# Patient Record
Sex: FEMALE | Race: WHITE | NOT HISPANIC OR LATINO | URBAN - METROPOLITAN AREA
[De-identification: names, ages, dates, MRNs, and addresses within clinical notes are randomized per-mention and may not be internally consistent; named-entity substitution may affect disease eponyms.]

---

## 2020-07-02 ENCOUNTER — OFFICE VISIT (OUTPATIENT)
Dept: URBAN - METROPOLITAN AREA CLINIC 54 | Facility: CLINIC | Age: 65
End: 2020-07-02

## 2020-07-13 ENCOUNTER — OFFICE VISIT (OUTPATIENT)
Dept: URBAN - METROPOLITAN AREA TELEHEALTH 2 | Facility: TELEHEALTH | Age: 65
End: 2020-07-13
Payer: MEDICARE

## 2020-07-13 DIAGNOSIS — R74.8 LIVER ENZYME ELEVATION: ICD-10-CM

## 2020-07-13 DIAGNOSIS — R10.84 ABDOMINAL CRAMPING, GENERALIZED: ICD-10-CM

## 2020-07-13 DIAGNOSIS — K58.9 IBS (IRRITABLE BOWEL SYNDROME)-C: ICD-10-CM

## 2020-07-13 DIAGNOSIS — E11.9 DIABETES: ICD-10-CM

## 2020-07-13 DIAGNOSIS — R10.10 UPPER ABDOMINAL PAIN: ICD-10-CM

## 2020-07-13 PROCEDURE — G9903 PT SCRN TBCO ID AS NON USER: HCPCS | Performed by: INTERNAL MEDICINE

## 2020-07-13 PROCEDURE — 99214 OFFICE O/P EST MOD 30 MIN: CPT | Performed by: INTERNAL MEDICINE

## 2020-07-13 PROCEDURE — 99244 OFF/OP CNSLTJ NEW/EST MOD 40: CPT | Performed by: INTERNAL MEDICINE

## 2020-07-13 PROCEDURE — 1036F TOBACCO NON-USER: CPT | Performed by: INTERNAL MEDICINE

## 2020-07-13 RX ORDER — OMEPRAZOLE 40 MG/1
1 CAPSULE 30 MINUTES BEFORE MORNING MEAL CAPSULE, DELAYED RELEASE ORAL ONCE A DAY
Qty: 30 | OUTPATIENT
Start: 2020-07-13

## 2020-07-13 RX ORDER — HYDROCHLOROTHIAZIDE 12.5 MG/1
1 TABLET IN THE MORNING TABLET ORAL ONCE A DAY
Status: ACTIVE | COMMUNITY

## 2020-07-13 RX ORDER — BACILLUS COAGULANS/LACTASE 500MM-3000
AS DIRECTED CAPSULE ORAL
Status: ACTIVE | COMMUNITY

## 2020-07-13 RX ORDER — TRAZODONE HYDROCHLORIDE 150 MG/1
1-2 TABLET AT BEDTIME TABLET ORAL
Status: ACTIVE | COMMUNITY

## 2020-07-13 RX ORDER — BUSPIRONE HYDROCHLORIDE 30 MG/1
1 TABLET TABLET ORAL TWICE A DAY
Status: ACTIVE | COMMUNITY

## 2020-07-13 RX ORDER — PAROXETINE HYDROCHLORIDE HEMIHYDRATE 20 MG/1
4 TABLETS IN THE MORNING TABLET, FILM COATED ORAL ONCE A DAY
Status: ACTIVE | COMMUNITY

## 2020-07-13 RX ORDER — DICYCLOMINE HYDROCHLORIDE 20 MG/1
TAKE 1 TABLET BY MOUTH THREE TIMES DAILY AS NEEDED FOR 30 DAYS TABLET ORAL THREE TIMES A DAY
Qty: 90 | Refills: 2 | OUTPATIENT
Start: 2020-07-13 | End: 2021-04-09

## 2020-07-13 RX ORDER — ZOLPIDEM TARTRATE 10 MG/1
1 TABLET AT BEDTIME AS NEEDED TABLET, FILM COATED ORAL ONCE A DAY
Status: ACTIVE | COMMUNITY

## 2020-07-13 RX ORDER — ATORVASTATIN CALCIUM 10 MG/1
1 TABLET TABLET, FILM COATED ORAL ONCE A DAY
Status: ACTIVE | COMMUNITY

## 2020-07-13 RX ORDER — OXYCODONE HYDROCHLORIDE AND ACETAMINOPHEN 7.5; 325 MG/1; MG/1
1 TABLET AS NEEDED TABLET ORAL
Status: ACTIVE | COMMUNITY

## 2020-07-13 RX ORDER — DICYCLOMINE HYDROCHLORIDE 20 MG/1
1 TABLET TABLET ORAL
Status: ACTIVE | COMMUNITY

## 2020-07-13 RX ORDER — LUBIPROSTONE 24 UG/1
1 CAPSULE WITH FOOD AND WATER CAPSULE, GELATIN COATED ORAL TWICE A DAY
Qty: 60 | OUTPATIENT
Start: 2020-07-13 | End: 2020-08-12

## 2020-07-13 RX ORDER — ALPRAZOLAM 1 MG/1
0.5-1 TABLET TABLET ORAL
Status: ACTIVE | COMMUNITY

## 2020-07-13 RX ORDER — ATENOLOL 25 MG/1
1 TABLET TABLET ORAL ONCE A DAY
Status: ACTIVE | COMMUNITY

## 2020-07-13 RX ORDER — MONTELUKAST SODIUM 10 MG/1
1 TABLET TABLET ORAL ONCE A DAY
Status: ACTIVE | COMMUNITY

## 2020-07-13 NOTE — HPI-TODAY'S VISIT:
Pt reports that she underwent Nissen fundoplication in 2017 along with hernia repair. Pt reports that since then she has had a sensation of a band around rib cage up front. Pt reports that she would have constipation. Pt reports that when she would eats she has abdominal bloating. pt reports that she looks pregnant.  Pt reports that she can feel acid in her stomach as well.  Pt reports that the stomach is tight when this occurs.  Pt reports that she will skip breakfast . Pt report that she has an excess amount of gas as well

## 2020-07-21 ENCOUNTER — LAB OUTSIDE AN ENCOUNTER (OUTPATIENT)
Dept: URBAN - METROPOLITAN AREA CLINIC 54 | Facility: CLINIC | Age: 65
End: 2020-07-21

## 2020-07-27 LAB — PANCREATIC ELASTASE, FECAL: >500

## 2020-08-06 ENCOUNTER — ERX REFILL RESPONSE (OUTPATIENT)
Dept: URBAN - METROPOLITAN AREA TELEHEALTH 2 | Facility: TELEHEALTH | Age: 65
End: 2020-08-06

## 2020-08-06 RX ORDER — OMEPRAZOLE 40 MG/1
1 CAPSULE 30 MINUTES BEFORE MORNING MEAL ONCE A DAY ORALLY 30 DAY(S) CAPSULE, DELAYED RELEASE ORAL
Qty: 90 CAPSULE | Refills: 0 | OUTPATIENT

## 2020-08-06 RX ORDER — OMEPRAZOLE 40 MG/1
1 CAPSULE 30 MINUTES BEFORE MORNING MEAL CAPSULE, DELAYED RELEASE ORAL ONCE A DAY
Qty: 30 | Refills: 0 | OUTPATIENT

## 2020-08-06 RX ORDER — LUBIPROSTONE 24 UG/1
1 CAPSULE WITH FOOD AND WATER TWICE A DAY ORALLY 30 DAY(S) CAPSULE, GELATIN COATED ORAL
Qty: 180 CAPSULE | Refills: 0 | OUTPATIENT

## 2020-08-06 RX ORDER — LUBIPROSTONE 24 UG/1
1 CAPSULE WITH FOOD AND WATER CAPSULE, GELATIN COATED ORAL TWICE A DAY
Qty: 60 | Refills: 0 | OUTPATIENT

## 2020-08-12 ENCOUNTER — OUT OF OFFICE VISIT (OUTPATIENT)
Dept: URBAN - METROPOLITAN AREA MEDICAL CENTER 23 | Facility: MEDICAL CENTER | Age: 65
End: 2020-08-12
Payer: MEDICARE

## 2020-08-12 DIAGNOSIS — R74.0 ABNORMAL AST AND ALT: ICD-10-CM

## 2020-08-12 DIAGNOSIS — K52.89 (LYMPHOCYTIC) MICROSCOPIC COLITIS: ICD-10-CM

## 2020-08-12 DIAGNOSIS — R74.8 ABNORMAL ALKALINE PHOSPHATASE TEST: ICD-10-CM

## 2020-08-12 PROCEDURE — 99222 1ST HOSP IP/OBS MODERATE 55: CPT | Performed by: REGISTERED NURSE

## 2020-08-12 PROCEDURE — G8427 DOCREV CUR MEDS BY ELIG CLIN: HCPCS | Performed by: REGISTERED NURSE

## 2020-08-31 ENCOUNTER — OFFICE VISIT (OUTPATIENT)
Dept: URBAN - METROPOLITAN AREA CLINIC 54 | Facility: CLINIC | Age: 65
End: 2020-08-31
Payer: MEDICARE

## 2020-08-31 DIAGNOSIS — R74.8 LIVER ENZYME ELEVATION: ICD-10-CM

## 2020-08-31 DIAGNOSIS — K52.9 COLITIS: ICD-10-CM

## 2020-08-31 DIAGNOSIS — K76.0 FATTY LIVER: ICD-10-CM

## 2020-08-31 DIAGNOSIS — K58.9 IBS (IRRITABLE BOWEL SYNDROME)-C: ICD-10-CM

## 2020-08-31 PROCEDURE — 43235 EGD DIAGNOSTIC BRUSH WASH: CPT | Performed by: INTERNAL MEDICINE

## 2020-08-31 RX ORDER — PAROXETINE HYDROCHLORIDE HEMIHYDRATE 20 MG/1
4 TABLETS IN THE MORNING TABLET, FILM COATED ORAL ONCE A DAY
Status: ACTIVE | COMMUNITY

## 2020-08-31 RX ORDER — OXYCODONE HYDROCHLORIDE AND ACETAMINOPHEN 7.5; 325 MG/1; MG/1
1 TABLET AS NEEDED TABLET ORAL
Status: ACTIVE | COMMUNITY

## 2020-08-31 RX ORDER — DICYCLOMINE HYDROCHLORIDE 20 MG/1
TAKE 1 TABLET BY MOUTH THREE TIMES DAILY AS NEEDED FOR 30 DAYS TABLET ORAL THREE TIMES A DAY
Qty: 90 | Refills: 2 | Status: ACTIVE | COMMUNITY
Start: 2020-07-13 | End: 2021-04-09

## 2020-08-31 RX ORDER — ATENOLOL 25 MG/1
1 TABLET TABLET ORAL ONCE A DAY
Status: ACTIVE | COMMUNITY

## 2020-08-31 RX ORDER — ALPRAZOLAM 1 MG/1
0.5-1 TABLET TABLET ORAL
Status: ACTIVE | COMMUNITY

## 2020-08-31 RX ORDER — SODIUM, POTASSIUM,MAG SULFATES 17.5-3.13G
354ML SOLUTION, RECONSTITUTED, ORAL ORAL
Qty: 354 MILLILITER | Refills: 0 | OUTPATIENT
Start: 2020-08-31 | End: 2020-09-01

## 2020-08-31 RX ORDER — BACILLUS COAGULANS/LACTASE 500MM-3000
AS DIRECTED CAPSULE ORAL
Status: ACTIVE | COMMUNITY

## 2020-08-31 RX ORDER — DICYCLOMINE HYDROCHLORIDE 20 MG/1
1 TABLET TABLET ORAL
Status: ACTIVE | COMMUNITY

## 2020-08-31 RX ORDER — HYDROCHLOROTHIAZIDE 12.5 MG/1
1 TABLET IN THE MORNING TABLET ORAL ONCE A DAY
Status: ACTIVE | COMMUNITY

## 2020-08-31 RX ORDER — BUSPIRONE HYDROCHLORIDE 30 MG/1
1 TABLET TABLET ORAL TWICE A DAY
Status: ACTIVE | COMMUNITY

## 2020-08-31 RX ORDER — TRAZODONE HYDROCHLORIDE 150 MG/1
1-2 TABLET AT BEDTIME TABLET ORAL
Status: ACTIVE | COMMUNITY

## 2020-08-31 RX ORDER — LUBIPROSTONE 24 UG/1
1 CAPSULE WITH FOOD AND WATER TWICE A DAY ORALLY 30 DAY(S) CAPSULE, GELATIN COATED ORAL
Qty: 180 CAPSULE | Refills: 0 | Status: ACTIVE | COMMUNITY

## 2020-08-31 RX ORDER — MONTELUKAST SODIUM 10 MG/1
1 TABLET TABLET ORAL ONCE A DAY
Status: ACTIVE | COMMUNITY

## 2020-08-31 RX ORDER — ZOLPIDEM TARTRATE 10 MG/1
1 TABLET AT BEDTIME AS NEEDED TABLET, FILM COATED ORAL ONCE A DAY
Status: ACTIVE | COMMUNITY

## 2020-08-31 RX ORDER — OMEPRAZOLE 40 MG/1
1 CAPSULE 30 MINUTES BEFORE MORNING MEAL ONCE A DAY ORALLY 30 DAY(S) CAPSULE, DELAYED RELEASE ORAL
Qty: 90 CAPSULE | Refills: 0 | Status: ACTIVE | COMMUNITY

## 2020-08-31 RX ORDER — ATORVASTATIN CALCIUM 10 MG/1
1 TABLET TABLET, FILM COATED ORAL ONCE A DAY
Status: ACTIVE | COMMUNITY

## 2020-08-31 NOTE — HPI-TODAY'S VISIT:
Ms. Christie Woods is a pleasant 64-year-old female who I initially saw on telemetry health visit on 7/13/2020 who had undergone Nissen fundoplication in 2017 along with hernia repair.  She had had a sensation of a band around the rib cage in the front along with constipation.  She reported abdominal bloating as well.  She reported that her stomach would be tight when this occurred and required her skipping breakfast.  She does have a history of underlying neurological disorder.  She was status post EGD and colonoscopy in 2019.  Initial plans were for fecal elastase and gastric emptying scan.  She was placed on omeprazole and dicyclomine along with amities a to help with her symptoms.  Since that time the patient was hospitalized with a transverse colon colitis.  She did had hospital imaging that was performed on 8/12/2020 which revealed which was an MRI that revealed mild steatotic hepatitis with no evidence of liver mass.  There was prior fundoplication.  There was a mild compression deformity of L2.  CT that was performed did reveal on 5/28/2020 there is a large amount of stool and the patient ended up hospitalized on 812 with mild wall thickening along the descending and sigmoid along with the distal transverse most likely consistent with ischemic colitis.  She was also noted to have increased liver enzymes with AST of 288 and ALT of 148 which were normal the month prior.  Her alk phos was elevated at 291.  She reports that she continues to have the discomfort in the upper abdomen in a bandlike fashion.  She was placed on broad-spectrum antibiotics and reports that she is feeling better.  He still takes her approximately 4 days to have a bowel movement.  She reports that her gammaglobulin levels were low.  She is also on several constipating medications including trazodone and chronic pain medications as well.

## 2020-09-02 LAB
A/G RATIO: 1.9
ALBUMIN: 4.2
ALKALINE PHOSPHATASE: 97
ALT (SGPT): 10
ANA DIRECT: POSITIVE
ANTI-DNA (DS) AB QN: <1
AST (SGOT): 13
BILIRUBIN, TOTAL: 0.3
BUN/CREATININE RATIO: 15
BUN: 11
CALCIUM: 9.4
CARBON DIOXIDE, TOTAL: 24
CHLORIDE: 101
CREATININE: 0.74
DEAMIDATED GLIADIN ABS, IGA: 2
DEAMIDATED GLIADIN ABS, IGG: 2
EGFR IF AFRICN AM: 98
EGFR IF NONAFRICN AM: 85
ENDOMYSIAL ANTIBODY IGA: NEGATIVE
GLOBULIN, TOTAL: 2.2
GLUCOSE: 126
IMMUNOGLOBULIN A, QN, SERUM: 169
Lab: (no result)
POTASSIUM: 4
PROTEIN, TOTAL: 6.4
RNP ANTIBODIES: 4.1
SJOGREN'S ANTI-SS-A: <0.2
SJOGREN'S ANTI-SS-B: <0.2
SMITH ANTIBODIES: <0.2
SODIUM: 141
T-TRANSGLUTAMINASE (TTG) IGA: <2
T-TRANSGLUTAMINASE (TTG) IGG: <2

## 2020-09-15 ENCOUNTER — TELEPHONE ENCOUNTER (OUTPATIENT)
Dept: URBAN - METROPOLITAN AREA CLINIC 54 | Facility: CLINIC | Age: 65
End: 2020-09-15

## 2020-09-18 ENCOUNTER — OFFICE VISIT (OUTPATIENT)
Dept: URBAN - METROPOLITAN AREA SURGERY CENTER 14 | Facility: SURGERY CENTER | Age: 65
End: 2020-09-18
Payer: MEDICARE

## 2020-09-18 ENCOUNTER — CLAIMS CREATED FROM THE CLAIM WINDOW (OUTPATIENT)
Dept: URBAN - METROPOLITAN AREA CLINIC 4 | Facility: CLINIC | Age: 65
End: 2020-09-18
Payer: MEDICARE

## 2020-09-18 DIAGNOSIS — K21.0 GASTRO-ESOPHAGEAL REFLUX DISEASE WITH ESOPHAGITIS: ICD-10-CM

## 2020-09-18 DIAGNOSIS — D12.5 BENIGN NEOPLASM OF SIGMOID COLON: ICD-10-CM

## 2020-09-18 DIAGNOSIS — K31.819 ANGIODYSPLASIA OF DUODENUM: ICD-10-CM

## 2020-09-18 DIAGNOSIS — R10.84 ABDOMINAL CRAMPING, GENERALIZED: ICD-10-CM

## 2020-09-18 DIAGNOSIS — D12.8 BENIGN NEOPLASM OF RECTUM: ICD-10-CM

## 2020-09-18 DIAGNOSIS — K63.5 BENIGN COLON POLYP: ICD-10-CM

## 2020-09-18 DIAGNOSIS — D12.2 ADENOMA OF ASCENDING COLON: ICD-10-CM

## 2020-09-18 DIAGNOSIS — K31.89 OTHER DISEASES OF STOMACH AND DUODENUM: ICD-10-CM

## 2020-09-18 DIAGNOSIS — K52.89 (LYMPHOCYTIC) MICROSCOPIC COLITIS: ICD-10-CM

## 2020-09-18 DIAGNOSIS — D12.2 BENIGN NEOPLASM OF ASCENDING COLON: ICD-10-CM

## 2020-09-18 DIAGNOSIS — K31.89 ACQUIRED DEFORMITY OF DUODENUM: ICD-10-CM

## 2020-09-18 DIAGNOSIS — K29.60 OTHER GASTRITIS WITHOUT BLEEDING: ICD-10-CM

## 2020-09-18 DIAGNOSIS — D12.5 ADENOMA OF SIGMOID COLON: ICD-10-CM

## 2020-09-18 DIAGNOSIS — K22.8 COLUMNAR-LINED ESOPHAGUS: ICD-10-CM

## 2020-09-18 PROCEDURE — G9937 DIG OR SURV COLSCO: HCPCS | Performed by: INTERNAL MEDICINE

## 2020-09-18 PROCEDURE — 43239 EGD BIOPSY SINGLE/MULTIPLE: CPT | Performed by: INTERNAL MEDICINE

## 2020-09-18 PROCEDURE — 88305 TISSUE EXAM BY PATHOLOGIST: CPT | Performed by: PATHOLOGY

## 2020-09-18 PROCEDURE — 88312 SPECIAL STAINS GROUP 1: CPT | Performed by: PATHOLOGY

## 2020-09-18 PROCEDURE — 45385 COLONOSCOPY W/LESION REMOVAL: CPT | Performed by: INTERNAL MEDICINE

## 2020-09-18 PROCEDURE — G8907 PT DOC NO EVENTS ON DISCHARG: HCPCS | Performed by: INTERNAL MEDICINE

## 2020-09-18 PROCEDURE — 45380 COLONOSCOPY AND BIOPSY: CPT | Performed by: INTERNAL MEDICINE

## 2020-10-13 ENCOUNTER — TELEPHONE ENCOUNTER (OUTPATIENT)
Dept: URBAN - METROPOLITAN AREA CLINIC 54 | Facility: CLINIC | Age: 65
End: 2020-10-13

## 2020-10-13 RX ORDER — METHYLNALTREXONE BROMIDE 150 MG/1
3 TABLETS 30 MINUTES BEFORE THE FIRST MEAL OF THE DAY TABLET ORAL ONCE A DAY
Qty: 90 | Refills: 6 | OUTPATIENT
Start: 2020-10-13 | End: 2021-05-11

## 2020-10-14 ENCOUNTER — OFFICE VISIT (OUTPATIENT)
Dept: URBAN - METROPOLITAN AREA CLINIC 54 | Facility: CLINIC | Age: 65
End: 2020-10-14

## 2020-10-15 ENCOUNTER — TELEPHONE ENCOUNTER (OUTPATIENT)
Dept: URBAN - METROPOLITAN AREA CLINIC 54 | Facility: CLINIC | Age: 65
End: 2020-10-15

## 2020-10-16 ENCOUNTER — TELEPHONE ENCOUNTER (OUTPATIENT)
Dept: URBAN - METROPOLITAN AREA CLINIC 92 | Facility: CLINIC | Age: 65
End: 2020-10-16

## 2020-10-16 RX ORDER — LACTULOSE 10 G/15ML
15 ML SOLUTION ORAL ONCE A DAY
Qty: 450 ML | Refills: 2 | OUTPATIENT

## 2020-10-25 ENCOUNTER — ERX REFILL RESPONSE (OUTPATIENT)
Dept: URBAN - METROPOLITAN AREA CLINIC 54 | Facility: CLINIC | Age: 65
End: 2020-10-25

## 2020-10-25 RX ORDER — METHYLNALTREXONE BROMIDE 150 MG/1
3 TABLETS 30 MINUTES BEFORE THE FIRST MEAL OF THE DAY TABLET ORAL ONCE A DAY
Qty: 90 | Refills: 6 | OUTPATIENT

## 2020-10-25 RX ORDER — METHYLNALTREXONE BROMIDE 150 MG/1
3 TABLETS 30 MINUTES BEFORE THE FIRST MEAL OF THE DAY TABLET ORAL
Qty: 90 TABLET | Refills: 6 | OUTPATIENT

## 2020-10-30 ENCOUNTER — ERX REFILL RESPONSE (OUTPATIENT)
Dept: URBAN - METROPOLITAN AREA CLINIC 54 | Facility: CLINIC | Age: 65
End: 2020-10-30

## 2020-10-30 RX ORDER — METHYLNALTREXONE BROMIDE 150 MG/1
3 TABLETS 30 MINUTES BEFORE THE FIRST MEAL OF THE DAY TABLET ORAL
Qty: 90 TABLET | Refills: 6 | OUTPATIENT

## 2020-10-30 RX ORDER — METHYLNALTREXONE BROMIDE 150 MG/1
3 TABLETS 30 MINUTES BEFORE THE FIRST MEAL OF THE DAY TABLET ORAL ONCE A DAY
Qty: 90 | Refills: 6 | OUTPATIENT

## 2020-11-24 ENCOUNTER — WEB ENCOUNTER (OUTPATIENT)
Dept: URBAN - METROPOLITAN AREA CLINIC 54 | Facility: CLINIC | Age: 65
End: 2020-11-24

## 2020-11-24 ENCOUNTER — OFFICE VISIT (OUTPATIENT)
Dept: URBAN - METROPOLITAN AREA CLINIC 54 | Facility: CLINIC | Age: 65
End: 2020-11-24
Payer: MEDICARE

## 2020-11-24 DIAGNOSIS — D12.2 BENIGN NEOPLASM OF ASCENDING COLON: ICD-10-CM

## 2020-11-24 DIAGNOSIS — R10.13 DYSPEPSIA: ICD-10-CM

## 2020-11-24 DIAGNOSIS — R74.8 LIVER ENZYME ELEVATION: ICD-10-CM

## 2020-11-24 DIAGNOSIS — D12.5 BENIGN NEOPLASM OF SIGMOID COLON: ICD-10-CM

## 2020-11-24 DIAGNOSIS — K58.9 IBS (IRRITABLE BOWEL SYNDROME)-C: ICD-10-CM

## 2020-11-24 DIAGNOSIS — K21.9 GERD: ICD-10-CM

## 2020-11-24 PROBLEM — 235595009 GASTROESOPHAGEAL REFLUX DISEASE: Status: ACTIVE | Noted: 2020-11-24

## 2020-11-24 PROCEDURE — 99214 OFFICE O/P EST MOD 30 MIN: CPT | Performed by: INTERNAL MEDICINE

## 2020-11-24 RX ORDER — DICYCLOMINE HYDROCHLORIDE 20 MG/1
TAKE 1 TABLET BY MOUTH THREE TIMES DAILY AS NEEDED FOR 30 DAYS TABLET ORAL THREE TIMES A DAY
Qty: 90 | Refills: 2 | Status: ACTIVE | COMMUNITY
Start: 2020-07-13 | End: 2021-04-09

## 2020-11-24 RX ORDER — BUSPIRONE HYDROCHLORIDE 30 MG/1
1 TABLET TABLET ORAL TWICE A DAY
Status: ACTIVE | COMMUNITY

## 2020-11-24 RX ORDER — LUBIPROSTONE 24 UG/1
1 CAPSULE WITH FOOD AND WATER TWICE A DAY ORALLY 30 DAY(S) CAPSULE, GELATIN COATED ORAL
Qty: 180 CAPSULE | Refills: 0 | Status: ACTIVE | COMMUNITY

## 2020-11-24 RX ORDER — ATENOLOL 25 MG/1
1 TABLET TABLET ORAL ONCE A DAY
Status: ACTIVE | COMMUNITY

## 2020-11-24 RX ORDER — ALPRAZOLAM 1 MG/1
0.5-1 TABLET TABLET ORAL
Status: ACTIVE | COMMUNITY

## 2020-11-24 RX ORDER — DICYCLOMINE HYDROCHLORIDE 20 MG/1
1 TABLET TABLET ORAL
Status: ACTIVE | COMMUNITY

## 2020-11-24 RX ORDER — MONTELUKAST SODIUM 10 MG/1
1 TABLET TABLET ORAL ONCE A DAY
Status: ACTIVE | COMMUNITY

## 2020-11-24 RX ORDER — METHYLNALTREXONE BROMIDE 150 MG/1
3 TABLETS 30 MINUTES BEFORE THE FIRST MEAL OF THE DAY TABLET ORAL
Qty: 90 TABLET | Refills: 6 | Status: ACTIVE | COMMUNITY

## 2020-11-24 RX ORDER — TRAZODONE HYDROCHLORIDE 150 MG/1
1-2 TABLET AT BEDTIME TABLET ORAL
Status: ACTIVE | COMMUNITY

## 2020-11-24 RX ORDER — PAROXETINE HYDROCHLORIDE HEMIHYDRATE 20 MG/1
4 TABLETS IN THE MORNING TABLET, FILM COATED ORAL ONCE A DAY
Status: ACTIVE | COMMUNITY

## 2020-11-24 RX ORDER — BACILLUS COAGULANS/LACTASE 500MM-3000
AS DIRECTED CAPSULE ORAL
Status: ACTIVE | COMMUNITY

## 2020-11-24 RX ORDER — OXYCODONE HYDROCHLORIDE AND ACETAMINOPHEN 7.5; 325 MG/1; MG/1
1 TABLET AS NEEDED TABLET ORAL
Status: ACTIVE | COMMUNITY

## 2020-11-24 RX ORDER — ZOLPIDEM TARTRATE 10 MG/1
1 TABLET AT BEDTIME AS NEEDED TABLET, FILM COATED ORAL ONCE A DAY
Status: ACTIVE | COMMUNITY

## 2020-11-24 RX ORDER — LACTULOSE 10 G/15ML
15 ML SOLUTION ORAL ONCE A DAY
Qty: 450 ML | Refills: 2 | Status: ACTIVE | COMMUNITY

## 2020-11-24 RX ORDER — ATORVASTATIN CALCIUM 10 MG/1
1 TABLET TABLET, FILM COATED ORAL ONCE A DAY
Status: ACTIVE | COMMUNITY

## 2020-11-24 RX ORDER — OMEPRAZOLE 40 MG/1
1 CAPSULE 30 MINUTES BEFORE MORNING MEAL ONCE A DAY ORALLY 30 DAY(S) CAPSULE, DELAYED RELEASE ORAL
Qty: 90 CAPSULE | Refills: 0 | Status: ACTIVE | COMMUNITY

## 2020-11-24 RX ORDER — HYDROCHLOROTHIAZIDE 12.5 MG/1
1 TABLET IN THE MORNING TABLET ORAL ONCE A DAY
Status: ACTIVE | COMMUNITY

## 2020-11-24 RX ORDER — LUBIPROSTONE 24 UG/1
1 CAPSULE WITH FOOD AND WATER TWICE A DAY ORALLY 30 DAY(S) CAPSULE, GELATIN COATED ORAL
Qty: 180 | Refills: 2

## 2020-11-24 NOTE — HPI-TODAY'S VISIT:
Pt reports that she has been doing ok. Pt s/p upper endoscopy with sensation of rocks in the stomach.Does still have some acid reflux changes but this is usually in the afternon. EGD with benign path except reflux. Pt report that she has cut back on her caffeine.  Pt reports that she is taking protonix.    Pt with mixed precancerous and hyperplastic polyps due for rp in 2023. Had diverticulosis and internal hemorrhoids as well. Taking amitiza and advised that amitiza would not be covered . Takes oxycodone twice monthly

## 2021-05-10 ENCOUNTER — WEB ENCOUNTER (OUTPATIENT)
Dept: URBAN - METROPOLITAN AREA CLINIC 54 | Facility: CLINIC | Age: 66
End: 2021-05-10

## 2021-05-10 ENCOUNTER — OFFICE VISIT (OUTPATIENT)
Dept: URBAN - METROPOLITAN AREA CLINIC 54 | Facility: CLINIC | Age: 66
End: 2021-05-10
Payer: MEDICARE

## 2021-05-10 VITALS
HEART RATE: 91 BPM | BODY MASS INDEX: 27.69 KG/M2 | TEMPERATURE: 96.3 F | WEIGHT: 176.4 LBS | HEIGHT: 67 IN | SYSTOLIC BLOOD PRESSURE: 103 MMHG | DIASTOLIC BLOOD PRESSURE: 70 MMHG

## 2021-05-10 DIAGNOSIS — R14.0 ABDOMINAL DISTENSION (GASEOUS): ICD-10-CM

## 2021-05-10 DIAGNOSIS — K59.01 CONSTIPATION BY DELAYED COLONIC TRANSIT: ICD-10-CM

## 2021-05-10 PROCEDURE — 99214 OFFICE O/P EST MOD 30 MIN: CPT | Performed by: INTERNAL MEDICINE

## 2021-05-10 RX ORDER — PAROXETINE HYDROCHLORIDE HEMIHYDRATE 20 MG/1
4 TABLETS IN THE MORNING TABLET, FILM COATED ORAL ONCE A DAY
Status: ACTIVE | COMMUNITY

## 2021-05-10 RX ORDER — LUBIPROSTONE 24 UG/1
1 CAPSULE WITH FOOD AND WATER TWICE A DAY ORALLY 30 DAY(S) CAPSULE, GELATIN COATED ORAL
Qty: 180 | Refills: 2 | Status: ON HOLD | COMMUNITY

## 2021-05-10 RX ORDER — LINACLOTIDE 145 UG/1
1 CAPSULE AT LEAST 30 MINUTES BEFORE THE FIRST MEAL OF THE DAY ON AN EMPTY STOMACH CAPSULE, GELATIN COATED ORAL ONCE A DAY
Qty: 30 | Refills: 2 | OUTPATIENT
Start: 2021-05-10 | End: 2021-08-08

## 2021-05-10 RX ORDER — OXYCODONE HYDROCHLORIDE AND ACETAMINOPHEN 7.5; 325 MG/1; MG/1
1 TABLET AS NEEDED TABLET ORAL
Status: ACTIVE | COMMUNITY

## 2021-05-10 RX ORDER — ALPRAZOLAM 1 MG/1
0.5-1 TABLET TABLET ORAL
Status: ACTIVE | COMMUNITY

## 2021-05-10 RX ORDER — DICYCLOMINE HYDROCHLORIDE 20 MG/1
1 TABLET TABLET ORAL
Status: ACTIVE | COMMUNITY

## 2021-05-10 RX ORDER — ZOLPIDEM TARTRATE 10 MG/1
1 TABLET AT BEDTIME AS NEEDED TABLET, FILM COATED ORAL ONCE A DAY
Status: ACTIVE | COMMUNITY

## 2021-05-10 RX ORDER — OMEPRAZOLE 40 MG/1
1 CAPSULE 30 MINUTES BEFORE MORNING MEAL ONCE A DAY ORALLY 30 DAY(S) CAPSULE, DELAYED RELEASE ORAL
Qty: 90 CAPSULE | Refills: 0 | Status: ACTIVE | COMMUNITY

## 2021-05-10 RX ORDER — ATORVASTATIN CALCIUM 10 MG/1
1 TABLET TABLET, FILM COATED ORAL ONCE A DAY
Status: DISCONTINUED | COMMUNITY

## 2021-05-10 RX ORDER — BACILLUS COAGULANS/LACTASE 500MM-3000
AS DIRECTED CAPSULE ORAL
Status: DISCONTINUED | COMMUNITY

## 2021-05-10 RX ORDER — TRAZODONE HYDROCHLORIDE 150 MG/1
1-2 TABLET AT BEDTIME TABLET ORAL
Status: ACTIVE | COMMUNITY

## 2021-05-10 RX ORDER — HYDROCHLOROTHIAZIDE 12.5 MG/1
1 TABLET IN THE MORNING TABLET ORAL ONCE A DAY
Status: DISCONTINUED | COMMUNITY

## 2021-05-10 RX ORDER — BUSPIRONE HYDROCHLORIDE 30 MG/1
1 TABLET TABLET ORAL TWICE A DAY
Status: ACTIVE | COMMUNITY

## 2021-05-10 RX ORDER — METRONIDAZOLE 500 MG/1
1 TABLET TABLET ORAL TWICE A DAY
Qty: 20 | OUTPATIENT
Start: 2021-05-10 | End: 2021-05-20

## 2021-05-10 RX ORDER — METHYLNALTREXONE BROMIDE 150 MG/1
3 TABLETS 30 MINUTES BEFORE THE FIRST MEAL OF THE DAY TABLET ORAL
Qty: 90 TABLET | Refills: 6 | Status: DISCONTINUED | COMMUNITY

## 2021-05-10 RX ORDER — MONTELUKAST SODIUM 10 MG/1
1 TABLET TABLET ORAL ONCE A DAY
Status: ACTIVE | COMMUNITY

## 2021-05-10 RX ORDER — ATENOLOL 25 MG/1
1 TABLET TABLET ORAL ONCE A DAY
Status: ACTIVE | COMMUNITY

## 2021-05-10 RX ORDER — LACTULOSE 10 G/15ML
15 ML SOLUTION ORAL ONCE A DAY
Qty: 450 ML | Refills: 2 | Status: DISCONTINUED | COMMUNITY

## 2021-05-10 NOTE — HPI-TODAY'S VISIT:
66 yo female with hx of increased abdominal distension. Pt reports that she has abdominal pain. Hx of small bowel imaging.  Pt reports that she has trouble regulating bowel movements and gas.  Pt reports that she does take oxycodone occasionally.  Pt reports that she does take trazadone for sleeping.

## 2021-05-28 ENCOUNTER — TELEPHONE ENCOUNTER (OUTPATIENT)
Dept: URBAN - METROPOLITAN AREA CLINIC 92 | Facility: CLINIC | Age: 66
End: 2021-05-28

## 2021-05-28 RX ORDER — OMEPRAZOLE 40 MG/1
1 CAPSULE 30 MINUTES BEFORE MORNING MEAL ORALLY ONCE A DAY CAPSULE, DELAYED RELEASE ORAL
Qty: 90 | Refills: 2

## 2021-06-24 ENCOUNTER — OFFICE VISIT (OUTPATIENT)
Dept: URBAN - METROPOLITAN AREA CLINIC 54 | Facility: CLINIC | Age: 66
End: 2021-06-24

## 2021-06-24 RX ORDER — OXYCODONE HYDROCHLORIDE AND ACETAMINOPHEN 7.5; 325 MG/1; MG/1
1 TABLET AS NEEDED TABLET ORAL
COMMUNITY

## 2021-06-24 RX ORDER — LUBIPROSTONE 24 UG/1
1 CAPSULE WITH FOOD AND WATER TWICE A DAY ORALLY 30 DAY(S) CAPSULE, GELATIN COATED ORAL
Qty: 180 | Refills: 2 | COMMUNITY

## 2021-06-24 RX ORDER — ALPRAZOLAM 1 MG/1
0.5-1 TABLET TABLET ORAL
COMMUNITY

## 2021-06-24 RX ORDER — DICYCLOMINE HYDROCHLORIDE 20 MG/1
1 TABLET TABLET ORAL
COMMUNITY

## 2021-06-24 RX ORDER — MONTELUKAST SODIUM 10 MG/1
1 TABLET TABLET ORAL ONCE A DAY
COMMUNITY

## 2021-06-24 RX ORDER — ATENOLOL 25 MG/1
1 TABLET TABLET ORAL ONCE A DAY
COMMUNITY

## 2021-06-24 RX ORDER — OMEPRAZOLE 40 MG/1
1 CAPSULE 30 MINUTES BEFORE MORNING MEAL ORALLY ONCE A DAY CAPSULE, DELAYED RELEASE ORAL
Qty: 90 | Refills: 2 | COMMUNITY

## 2021-06-24 RX ORDER — LINACLOTIDE 145 UG/1
1 CAPSULE AT LEAST 30 MINUTES BEFORE THE FIRST MEAL OF THE DAY ON AN EMPTY STOMACH CAPSULE, GELATIN COATED ORAL ONCE A DAY
Qty: 30 | Refills: 2 | COMMUNITY

## 2021-06-24 RX ORDER — ZOLPIDEM TARTRATE 10 MG/1
1 TABLET AT BEDTIME AS NEEDED TABLET, FILM COATED ORAL ONCE A DAY
COMMUNITY

## 2021-06-24 RX ORDER — BUSPIRONE HYDROCHLORIDE 30 MG/1
1 TABLET TABLET ORAL TWICE A DAY
COMMUNITY

## 2021-06-24 RX ORDER — PAROXETINE HYDROCHLORIDE HEMIHYDRATE 20 MG/1
4 TABLETS IN THE MORNING TABLET, FILM COATED ORAL ONCE A DAY
COMMUNITY

## 2021-06-24 RX ORDER — TRAZODONE HYDROCHLORIDE 150 MG/1
1-2 TABLET AT BEDTIME TABLET ORAL
COMMUNITY

## 2021-07-13 ENCOUNTER — TELEPHONE ENCOUNTER (OUTPATIENT)
Dept: URBAN - METROPOLITAN AREA SURGERY CENTER 30 | Facility: SURGERY CENTER | Age: 66
End: 2021-07-13

## 2021-08-19 ENCOUNTER — OFFICE VISIT (OUTPATIENT)
Dept: URBAN - METROPOLITAN AREA CLINIC 54 | Facility: CLINIC | Age: 66
End: 2021-08-19
Payer: MEDICARE

## 2021-08-19 DIAGNOSIS — R14.0 ABDOMINAL DISTENSION (GASEOUS): ICD-10-CM

## 2021-08-19 DIAGNOSIS — K59.09 CHRONIC CONSTIPATION: ICD-10-CM

## 2021-08-19 DIAGNOSIS — K22.9 ESOPHAGEAL ABNORMALITY: ICD-10-CM

## 2021-08-19 PROCEDURE — 99213 OFFICE O/P EST LOW 20 MIN: CPT | Performed by: INTERNAL MEDICINE

## 2021-08-19 RX ORDER — LINACLOTIDE 145 UG/1
1 CAPSULE AT LEAST 30 MINUTES BEFORE THE FIRST MEAL OF THE DAY ON AN EMPTY STOMACH CAPSULE, GELATIN COATED ORAL ONCE A DAY
Qty: 30 | Refills: 2 | COMMUNITY

## 2021-08-19 RX ORDER — TRAZODONE HYDROCHLORIDE 150 MG/1
1-2 TABLET AT BEDTIME TABLET ORAL
COMMUNITY

## 2021-08-19 RX ORDER — OXYCODONE HYDROCHLORIDE AND ACETAMINOPHEN 7.5; 325 MG/1; MG/1
1 TABLET AS NEEDED TABLET ORAL
COMMUNITY

## 2021-08-19 RX ORDER — ATENOLOL 25 MG/1
1 TABLET TABLET ORAL ONCE A DAY
COMMUNITY

## 2021-08-19 RX ORDER — ZOLPIDEM TARTRATE 10 MG/1
1 TABLET AT BEDTIME AS NEEDED TABLET, FILM COATED ORAL ONCE A DAY
COMMUNITY

## 2021-08-19 RX ORDER — BUSPIRONE HYDROCHLORIDE 30 MG/1
1 TABLET TABLET ORAL TWICE A DAY
COMMUNITY

## 2021-08-19 RX ORDER — MONTELUKAST SODIUM 10 MG/1
1 TABLET TABLET ORAL ONCE A DAY
COMMUNITY

## 2021-08-19 RX ORDER — DICYCLOMINE HYDROCHLORIDE 20 MG/1
1 TABLET TABLET ORAL
COMMUNITY

## 2021-08-19 RX ORDER — PAROXETINE HYDROCHLORIDE HEMIHYDRATE 20 MG/1
4 TABLETS IN THE MORNING TABLET, FILM COATED ORAL ONCE A DAY
COMMUNITY

## 2021-08-19 RX ORDER — ALPRAZOLAM 1 MG/1
0.5-1 TABLET TABLET ORAL
COMMUNITY

## 2021-08-19 RX ORDER — LUBIPROSTONE 24 UG/1
1 CAPSULE WITH FOOD AND WATER TWICE A DAY ORALLY 30 DAY(S) CAPSULE, GELATIN COATED ORAL
Qty: 180 | Refills: 2 | COMMUNITY

## 2021-08-19 RX ORDER — OMEPRAZOLE 40 MG/1
1 CAPSULE 30 MINUTES BEFORE MORNING MEAL ORALLY ONCE A DAY CAPSULE, DELAYED RELEASE ORAL
Qty: 90 | Refills: 2 | COMMUNITY

## 2021-08-19 NOTE — HPI-TODAY'S VISIT:
64 yo female with hx of increased abdominal distension. Pt reports that she has abdominal pain. Hx of small bowel imaging.  Pt reports that she has trouble regulating bowel movements and gas.  Pt reports that she does take oxycodone occasionally.  Pt reports that she does take trazadone for sleeping. Pt reports that she has been doing ok. Pt s/p upper endoscopy with sensation of rocks in the stomach.Does still have some acid reflux changes but this is usually in the afternon. EGD with benign path except reflux. Pt report that she has cut back on her caffeine.  Pt reports that she is taking protonix.    Pt with mixed precancerous and hyperplastic polyps due for rp in 2023. Had diverticulosis and internal hemorrhoids as well. Taking amitiza and advised that amitiza would not be covered . Takes oxycodone twice monthly  8/19/21: Pt RTC for f/u. Had SBFT 7/1/21.  IMPRESSION  1. Postsurgical changes related to fundoplication. Small prominence likely postsurgical or mildly patulous portion of the wrap and less likely other etiologies above. 2. Esophageal dysmotility. 3. Narrowing along the distal thoracic esophagus without delay in passage of a 12.5 mm barium tablet. 4. Small duodenal diverticulum.  She is scheduled for EGD on 8/20/21. She reports constipation, gas and distention. She was presribed Linzess, but copay in $200, which she can't afford. She will go a week without a BM. Her last BM was this AM. Denies hard stools. Occasionally strains. Does not feel like she completely evacuates.

## 2021-08-20 ENCOUNTER — OFFICE VISIT (OUTPATIENT)
Dept: URBAN - METROPOLITAN AREA SURGERY CENTER 14 | Facility: SURGERY CENTER | Age: 66
End: 2021-08-20
Payer: MEDICARE

## 2021-08-20 ENCOUNTER — CLAIMS CREATED FROM THE CLAIM WINDOW (OUTPATIENT)
Dept: URBAN - METROPOLITAN AREA CLINIC 4 | Facility: CLINIC | Age: 66
End: 2021-08-20
Payer: MEDICARE

## 2021-08-20 DIAGNOSIS — R93.3 ABN FINDINGS-GI TRACT: ICD-10-CM

## 2021-08-20 DIAGNOSIS — K31.89 ACQUIRED DEFORMITY OF DUODENUM: ICD-10-CM

## 2021-08-20 DIAGNOSIS — K21.9 GASTRO-ESOPHAGEAL REFLUX DISEASE WITHOUT ESOPHAGITIS: ICD-10-CM

## 2021-08-20 DIAGNOSIS — K31.89 GASTRIC FOVEOLAR HYPERPLASIA: ICD-10-CM

## 2021-08-20 DIAGNOSIS — R13.19 CERVICAL DYSPHAGIA: ICD-10-CM

## 2021-08-20 DIAGNOSIS — K21.9 ACID REFLUX: ICD-10-CM

## 2021-08-20 PROCEDURE — 43239 EGD BIOPSY SINGLE/MULTIPLE: CPT | Performed by: INTERNAL MEDICINE

## 2021-08-20 PROCEDURE — 88305 TISSUE EXAM BY PATHOLOGIST: CPT | Performed by: PATHOLOGY

## 2021-08-20 PROCEDURE — 88312 SPECIAL STAINS GROUP 1: CPT | Performed by: PATHOLOGY

## 2021-08-20 PROCEDURE — G8907 PT DOC NO EVENTS ON DISCHARG: HCPCS | Performed by: INTERNAL MEDICINE

## 2021-08-20 PROCEDURE — 43450 DILATE ESOPHAGUS 1/MULT PASS: CPT | Performed by: INTERNAL MEDICINE

## 2021-08-20 RX ORDER — PAROXETINE HYDROCHLORIDE HEMIHYDRATE 20 MG/1
4 TABLETS IN THE MORNING TABLET, FILM COATED ORAL ONCE A DAY
COMMUNITY

## 2021-08-20 RX ORDER — ZOLPIDEM TARTRATE 10 MG/1
1 TABLET AT BEDTIME AS NEEDED TABLET, FILM COATED ORAL ONCE A DAY
COMMUNITY

## 2021-08-20 RX ORDER — ALPRAZOLAM 1 MG/1
0.5-1 TABLET TABLET ORAL
COMMUNITY

## 2021-08-20 RX ORDER — LINACLOTIDE 145 UG/1
1 CAPSULE AT LEAST 30 MINUTES BEFORE THE FIRST MEAL OF THE DAY ON AN EMPTY STOMACH CAPSULE, GELATIN COATED ORAL ONCE A DAY
Qty: 30 | Refills: 2 | COMMUNITY

## 2021-08-20 RX ORDER — TRAZODONE HYDROCHLORIDE 150 MG/1
1-2 TABLET AT BEDTIME TABLET ORAL
COMMUNITY

## 2021-08-20 RX ORDER — OXYCODONE HYDROCHLORIDE AND ACETAMINOPHEN 7.5; 325 MG/1; MG/1
1 TABLET AS NEEDED TABLET ORAL
COMMUNITY

## 2021-08-20 RX ORDER — BUSPIRONE HYDROCHLORIDE 30 MG/1
1 TABLET TABLET ORAL TWICE A DAY
COMMUNITY

## 2021-08-20 RX ORDER — OMEPRAZOLE 40 MG/1
1 CAPSULE 30 MINUTES BEFORE MORNING MEAL ORALLY ONCE A DAY CAPSULE, DELAYED RELEASE ORAL
Qty: 90 | Refills: 2 | COMMUNITY

## 2021-08-20 RX ORDER — LUBIPROSTONE 24 UG/1
1 CAPSULE WITH FOOD AND WATER TWICE A DAY ORALLY 30 DAY(S) CAPSULE, GELATIN COATED ORAL
Qty: 180 | Refills: 2 | COMMUNITY

## 2021-08-20 RX ORDER — DICYCLOMINE HYDROCHLORIDE 20 MG/1
1 TABLET TABLET ORAL
COMMUNITY

## 2021-08-20 RX ORDER — ATENOLOL 25 MG/1
1 TABLET TABLET ORAL ONCE A DAY
COMMUNITY

## 2021-08-20 RX ORDER — MONTELUKAST SODIUM 10 MG/1
1 TABLET TABLET ORAL ONCE A DAY
COMMUNITY

## 2021-09-02 ENCOUNTER — TELEPHONE ENCOUNTER (OUTPATIENT)
Dept: URBAN - METROPOLITAN AREA CLINIC 54 | Facility: CLINIC | Age: 66
End: 2021-09-02

## 2021-09-15 ENCOUNTER — TELEPHONE ENCOUNTER (OUTPATIENT)
Dept: URBAN - METROPOLITAN AREA CLINIC 92 | Facility: CLINIC | Age: 66
End: 2021-09-15

## 2021-09-15 RX ORDER — DICYCLOMINE HYDROCHLORIDE 20 MG/1
1 TABLET TABLET ORAL
Qty: 90 | Refills: 1

## 2021-09-23 ENCOUNTER — LAB OUTSIDE AN ENCOUNTER (OUTPATIENT)
Dept: URBAN - METROPOLITAN AREA CLINIC 54 | Facility: CLINIC | Age: 66
End: 2021-09-23

## 2021-09-23 ENCOUNTER — WEB ENCOUNTER (OUTPATIENT)
Dept: URBAN - METROPOLITAN AREA CLINIC 54 | Facility: CLINIC | Age: 66
End: 2021-09-23

## 2021-09-23 ENCOUNTER — OFFICE VISIT (OUTPATIENT)
Dept: URBAN - METROPOLITAN AREA CLINIC 54 | Facility: CLINIC | Age: 66
End: 2021-09-23
Payer: MEDICARE

## 2021-09-23 VITALS
HEIGHT: 67 IN | BODY MASS INDEX: 28.25 KG/M2 | TEMPERATURE: 97.5 F | WEIGHT: 180 LBS | DIASTOLIC BLOOD PRESSURE: 67 MMHG | SYSTOLIC BLOOD PRESSURE: 113 MMHG | HEART RATE: 71 BPM

## 2021-09-23 DIAGNOSIS — K59.01 CONSTIPATION BY DELAYED COLONIC TRANSIT: ICD-10-CM

## 2021-09-23 DIAGNOSIS — K21.9 GASTROESOPHAGEAL REFLUX DISEASE WITHOUT ESOPHAGITIS: ICD-10-CM

## 2021-09-23 DIAGNOSIS — R10.13 EPIGASTRIC PAIN: ICD-10-CM

## 2021-09-23 DIAGNOSIS — K22.9 ESOPHAGEAL ABNORMALITY: ICD-10-CM

## 2021-09-23 PROBLEM — 37657006: Status: ACTIVE | Noted: 2021-07-19

## 2021-09-23 PROBLEM — 266435005: Status: ACTIVE | Noted: 2021-09-23

## 2021-09-23 PROBLEM — 35298007: Status: ACTIVE | Noted: 2021-05-10

## 2021-09-23 PROCEDURE — 99214 OFFICE O/P EST MOD 30 MIN: CPT | Performed by: REGISTERED NURSE

## 2021-09-23 RX ORDER — OXYCODONE HYDROCHLORIDE AND ACETAMINOPHEN 7.5; 325 MG/1; MG/1
1 TABLET AS NEEDED TABLET ORAL
Status: ACTIVE | COMMUNITY

## 2021-09-23 RX ORDER — ZOLPIDEM TARTRATE 10 MG/1
1 TABLET AT BEDTIME AS NEEDED TABLET, FILM COATED ORAL ONCE A DAY
Status: ACTIVE | COMMUNITY

## 2021-09-23 RX ORDER — TRAZODONE HYDROCHLORIDE 150 MG/1
1-2 TABLET AT BEDTIME TABLET ORAL
Status: ACTIVE | COMMUNITY

## 2021-09-23 RX ORDER — ALPRAZOLAM 1 MG/1
0.5-1 TABLET TABLET ORAL
Status: ACTIVE | COMMUNITY

## 2021-09-23 RX ORDER — ATENOLOL 25 MG/1
1 TABLET TABLET ORAL ONCE A DAY
Status: ACTIVE | COMMUNITY

## 2021-09-23 RX ORDER — SUCRALFATE 1 G
1 TABLET ON AN EMPTY STOMACH TABLET ORAL TWICE A DAY
Qty: 60 | Refills: 0 | OUTPATIENT
Start: 2021-09-23 | End: 2021-10-23

## 2021-09-23 RX ORDER — MONTELUKAST SODIUM 10 MG/1
1 TABLET TABLET ORAL ONCE A DAY
Status: ACTIVE | COMMUNITY

## 2021-09-23 RX ORDER — DICYCLOMINE HYDROCHLORIDE 20 MG/1
1 TABLET TABLET ORAL
Qty: 90 | Refills: 1 | Status: ACTIVE | COMMUNITY

## 2021-09-23 RX ORDER — PAROXETINE HYDROCHLORIDE HEMIHYDRATE 20 MG/1
4 TABLETS IN THE MORNING TABLET, FILM COATED ORAL ONCE A DAY
Status: ACTIVE | COMMUNITY

## 2021-09-23 RX ORDER — OMEPRAZOLE 40 MG/1
1 CAPSULE 30 MINUTES BEFORE MORNING MEAL ORALLY ONCE A DAY CAPSULE, DELAYED RELEASE ORAL
Qty: 90 | Refills: 2 | Status: ACTIVE | COMMUNITY

## 2021-09-23 RX ORDER — LINACLOTIDE 145 UG/1
1 CAPSULE AT LEAST 30 MINUTES BEFORE THE FIRST MEAL OF THE DAY ON AN EMPTY STOMACH CAPSULE, GELATIN COATED ORAL ONCE A DAY
Qty: 30 | Refills: 2 | Status: ACTIVE | COMMUNITY

## 2021-09-23 RX ORDER — LUBIPROSTONE 24 UG/1
1 CAPSULE WITH FOOD AND WATER TWICE A DAY ORALLY 30 DAY(S) CAPSULE, GELATIN COATED ORAL
Qty: 180 | Refills: 2 | Status: ACTIVE | COMMUNITY

## 2021-09-23 RX ORDER — BUSPIRONE HYDROCHLORIDE 30 MG/1
1 TABLET TABLET ORAL TWICE A DAY
Status: ACTIVE | COMMUNITY

## 2021-09-23 NOTE — PHYSICAL EXAM GASTROINTESTINAL
Abdomen , soft, epigastric TTP, nondistended , no guarding or rigidity , no masses palpable , normal bowel sounds , Liver and Spleen , no hepatomegaly present , no hepatosplenomegaly , liver nontender , spleen not palpable

## 2021-09-23 NOTE — HPI-TODAY'S VISIT:
64 yo female with hx of increased abdominal distension. Pt reports that she has abdominal pain. Hx of small bowel imaging.  Pt reports that she has trouble regulating bowel movements and gas.  Pt reports that she does take oxycodone occasionally.  Pt reports that she does take trazadone for sleeping. Pt reports that she has been doing ok. Pt s/p upper endoscopy with sensation of rocks in the stomach.Does still have some acid reflux changes but this is usually in the afternon. EGD with benign path except reflux. Pt report that she has cut back on her caffeine.  Pt reports that she is taking protonix.    Pt with mixed precancerous and hyperplastic polyps due for rp in 2023. Had diverticulosis and internal hemorrhoids as well. Taking amitiza and advised that amitiza would not be covered . Takes oxycodone twice monthly  8/19/21: Pt RTC for f/u. Had SBFT 7/1/21.  IMPRESSION  1. Postsurgical changes related to fundoplication. Small prominence likely postsurgical or mildly patulous portion of the wrap and less likely other etiologies above. 2. Esophageal dysmotility. 3. Narrowing along the distal thoracic esophagus without delay in passage of a 12.5 mm barium tablet. 4. Small duodenal diverticulum.  She is scheduled for EGD on 8/20/21. She reports constipation, gas and distention. She was presribed Linzess, but copay in $200, which she can't afford. She will go a week without a BM. Her last BM was this AM. Denies hard stools. Occasionally strains. Does not feel like she completely evacuates.   9/23/21: Pt RTC for f/u. Had EGD 8/20/21: - Z-line variable, 35 cm from the incisors. Biopsied. - Abnormal esophageal motility. Biopsied. - Erythematous mucosa in the stomach. Biopsied. - A Nissen fundoplication was found. The wrap appears tight. - Normal examined duodenum. - Dilation performed in the entire esophagus. Esophageal bx c/w acid reflux type changes. Neg for Barretts, EOE, dysplasia or malignancy. Stomach bx c/w foveolar hyperplasia. Neg for HPY, dysplasia or malignancy.  Remains on Linzes 145 mcg which helps with constipation. She mostly c/o epigastric discomfort with associated nausea and bloating. No aggravating or alleviating factors. Taking omeprazole 40 mg daily and Bentyl as needed. Denies osvaldo a or hematochezia.

## 2021-10-15 ENCOUNTER — ERX REFILL RESPONSE (OUTPATIENT)
Dept: URBAN - METROPOLITAN AREA CLINIC 54 | Facility: CLINIC | Age: 66
End: 2021-10-15

## 2021-10-15 RX ORDER — SUCRALFATE 1 G/1
1 TABLET TWICE A DAY ON AN EMPTY STOMACH TABLET ORAL
Qty: 60 TABLET | Refills: 1 | OUTPATIENT

## 2021-10-15 RX ORDER — SUCRALFATE 1 G
1 TABLET ON AN EMPTY STOMACH TABLET ORAL TWICE A DAY
Qty: 60 | Refills: 0 | OUTPATIENT

## 2021-11-09 ENCOUNTER — ERX REFILL RESPONSE (OUTPATIENT)
Dept: URBAN - METROPOLITAN AREA CLINIC 54 | Facility: CLINIC | Age: 66
End: 2021-11-09

## 2021-11-09 RX ORDER — SUCRALFATE 1 G/1
TAKE 1 TABLET BY MOUTH TWICE A DAY ON EMPTY STOMACH TABLET ORAL
Qty: 60 TABLET | Refills: 1 | OUTPATIENT

## 2021-11-09 RX ORDER — SUCRALFATE 1 G/1
1 TABLET TWICE A DAY ON AN EMPTY STOMACH TABLET ORAL
Qty: 60 TABLET | Refills: 1 | OUTPATIENT

## 2021-12-16 ENCOUNTER — OFFICE VISIT (OUTPATIENT)
Dept: URBAN - METROPOLITAN AREA CLINIC 54 | Facility: CLINIC | Age: 66
End: 2021-12-16

## 2021-12-16 RX ORDER — OMEPRAZOLE 40 MG/1
1 CAPSULE 30 MINUTES BEFORE MORNING MEAL ORALLY ONCE A DAY CAPSULE, DELAYED RELEASE ORAL
Qty: 90 | Refills: 2 | Status: ACTIVE | COMMUNITY

## 2021-12-16 RX ORDER — MONTELUKAST SODIUM 10 MG/1
1 TABLET TABLET ORAL ONCE A DAY
Status: ACTIVE | COMMUNITY

## 2021-12-16 RX ORDER — SUCRALFATE 1 G/1
TAKE 1 TABLET BY MOUTH TWICE A DAY ON EMPTY STOMACH TABLET ORAL
Qty: 60 TABLET | Refills: 1 | Status: ACTIVE | COMMUNITY

## 2021-12-16 RX ORDER — BUSPIRONE HYDROCHLORIDE 30 MG/1
1 TABLET TABLET ORAL TWICE A DAY
Status: ACTIVE | COMMUNITY

## 2021-12-16 RX ORDER — LINACLOTIDE 145 UG/1
1 CAPSULE AT LEAST 30 MINUTES BEFORE THE FIRST MEAL OF THE DAY ON AN EMPTY STOMACH CAPSULE, GELATIN COATED ORAL ONCE A DAY
Qty: 30 | Refills: 2 | Status: ACTIVE | COMMUNITY

## 2021-12-16 RX ORDER — ZOLPIDEM TARTRATE 10 MG/1
1 TABLET AT BEDTIME AS NEEDED TABLET, FILM COATED ORAL ONCE A DAY
Status: ACTIVE | COMMUNITY

## 2021-12-16 RX ORDER — ATENOLOL 25 MG/1
1 TABLET TABLET ORAL ONCE A DAY
Status: ACTIVE | COMMUNITY

## 2021-12-16 RX ORDER — DICYCLOMINE HYDROCHLORIDE 20 MG/1
1 TABLET TABLET ORAL
Qty: 90 | Refills: 1 | Status: ACTIVE | COMMUNITY

## 2021-12-16 RX ORDER — ALPRAZOLAM 1 MG/1
0.5-1 TABLET TABLET ORAL
Status: ACTIVE | COMMUNITY

## 2021-12-16 RX ORDER — LUBIPROSTONE 24 UG/1
1 CAPSULE WITH FOOD AND WATER TWICE A DAY ORALLY 30 DAY(S) CAPSULE, GELATIN COATED ORAL
Qty: 180 | Refills: 2 | Status: ACTIVE | COMMUNITY

## 2021-12-16 RX ORDER — TRAZODONE HYDROCHLORIDE 150 MG/1
1-2 TABLET AT BEDTIME TABLET ORAL
Status: ACTIVE | COMMUNITY

## 2021-12-16 RX ORDER — OXYCODONE HYDROCHLORIDE AND ACETAMINOPHEN 7.5; 325 MG/1; MG/1
1 TABLET AS NEEDED TABLET ORAL
Status: ACTIVE | COMMUNITY

## 2021-12-16 RX ORDER — PAROXETINE HYDROCHLORIDE HEMIHYDRATE 20 MG/1
4 TABLETS IN THE MORNING TABLET, FILM COATED ORAL ONCE A DAY
Status: ACTIVE | COMMUNITY

## 2022-03-14 ENCOUNTER — WEB ENCOUNTER (OUTPATIENT)
Dept: URBAN - METROPOLITAN AREA CLINIC 54 | Facility: CLINIC | Age: 67
End: 2022-03-14

## 2022-03-14 ENCOUNTER — OFFICE VISIT (OUTPATIENT)
Dept: URBAN - METROPOLITAN AREA CLINIC 54 | Facility: CLINIC | Age: 67
End: 2022-03-14
Payer: MEDICARE

## 2022-03-14 DIAGNOSIS — E11.9 DIABETES: ICD-10-CM

## 2022-03-14 DIAGNOSIS — K58.1 IRRITABLE BOWEL SYNDROME WITH CONSTIPATION: ICD-10-CM

## 2022-03-14 DIAGNOSIS — K22.9 ESOPHAGEAL ABNORMALITY: ICD-10-CM

## 2022-03-14 DIAGNOSIS — K21.9 GERD: ICD-10-CM

## 2022-03-14 DIAGNOSIS — R74.8 LIVER ENZYME ELEVATION: ICD-10-CM

## 2022-03-14 PROCEDURE — 99213 OFFICE O/P EST LOW 20 MIN: CPT | Performed by: INTERNAL MEDICINE

## 2022-03-14 RX ORDER — BUSPIRONE HYDROCHLORIDE 30 MG/1
1 TABLET TABLET ORAL TWICE A DAY
Status: DISCONTINUED | COMMUNITY

## 2022-03-14 RX ORDER — PAROXETINE HYDROCHLORIDE HEMIHYDRATE 20 MG/1
4 TABLETS IN THE MORNING TABLET, FILM COATED ORAL ONCE A DAY
Status: ACTIVE | COMMUNITY

## 2022-03-14 RX ORDER — ALPRAZOLAM 1 MG/1
0.5-1 TABLET TABLET ORAL
Status: ACTIVE | COMMUNITY

## 2022-03-14 RX ORDER — OXYCODONE HYDROCHLORIDE AND ACETAMINOPHEN 7.5; 325 MG/1; MG/1
1 TABLET AS NEEDED TABLET ORAL
Status: ACTIVE | COMMUNITY

## 2022-03-14 RX ORDER — TRAZODONE HYDROCHLORIDE 150 MG/1
1-2 TABLET AT BEDTIME TABLET ORAL
Status: ACTIVE | COMMUNITY

## 2022-03-14 RX ORDER — DICYCLOMINE HYDROCHLORIDE 20 MG/1
1 TABLET TABLET ORAL
Qty: 90 | Refills: 1 | Status: ACTIVE | COMMUNITY

## 2022-03-14 RX ORDER — LINACLOTIDE 145 UG/1
1 CAPSULE AT LEAST 30 MINUTES BEFORE THE FIRST MEAL OF THE DAY ON AN EMPTY STOMACH CAPSULE, GELATIN COATED ORAL ONCE A DAY
Qty: 30 | Refills: 2 | Status: ACTIVE | COMMUNITY

## 2022-03-14 RX ORDER — LUBIPROSTONE 24 UG/1
1 CAPSULE WITH FOOD AND WATER TWICE A DAY ORALLY 30 DAY(S) CAPSULE, GELATIN COATED ORAL
Qty: 180 | Refills: 2 | Status: DISCONTINUED | COMMUNITY

## 2022-03-14 RX ORDER — MONTELUKAST SODIUM 10 MG/1
1 TABLET TABLET ORAL ONCE A DAY
Status: DISCONTINUED | COMMUNITY

## 2022-03-14 RX ORDER — ATENOLOL 25 MG/1
1 TABLET TABLET ORAL ONCE A DAY
Status: ACTIVE | COMMUNITY

## 2022-03-14 RX ORDER — OMEPRAZOLE 40 MG/1
1 CAPSULE 30 MINUTES BEFORE MORNING MEAL ORALLY ONCE A DAY CAPSULE, DELAYED RELEASE ORAL
Qty: 90 | Refills: 2 | Status: ACTIVE | COMMUNITY

## 2022-03-14 RX ORDER — ZOLPIDEM TARTRATE 10 MG/1
1 TABLET AT BEDTIME AS NEEDED TABLET, FILM COATED ORAL ONCE A DAY
Status: ACTIVE | COMMUNITY

## 2022-03-14 RX ORDER — SUCRALFATE 1 G/1
TAKE 1 TABLET BY MOUTH TWICE A DAY ON EMPTY STOMACH TABLET ORAL
Qty: 60 TABLET | Refills: 1 | Status: ACTIVE | COMMUNITY

## 2022-03-14 NOTE — HPI-TODAY'S VISIT:
66 yo female with hx of increased abdominal distension. Pt reports that she has abdominal pain. Hx of small bowel imaging.  Pt reports that she has trouble regulating bowel movements and gas.  Pt reports that she does take oxycodone occasionally.  Pt reports that she does take trazadone for sleeping. Pt reports that she has been doing ok. Pt s/p upper endoscopy with sensation of rocks in the stomach.Does still have some acid reflux changes but this is usually in the afternon. EGD with benign path except reflux. Pt report that she has cut back on her caffeine.  Pt reports that she is taking protonix.    Pt with mixed precancerous and hyperplastic polyps due for rp in 2023. Had diverticulosis and internal hemorrhoids as well. Taking amitiza and advised that amitiza would not be covered . Takes oxycodone twice monthly  8/19/21: Pt RTC for f/u. Had SBFT 7/1/21.  IMPRESSION  1. Postsurgical changes related to fundoplication. Small prominence likely postsurgical or mildly patulous portion of the wrap and less likely other etiologies above. 2. Esophageal dysmotility. 3. Narrowing along the distal thoracic esophagus without delay in passage of a 12.5 mm barium tablet. 4. Small duodenal diverticulum.  She is scheduled for EGD on 8/20/21. She reports constipation, gas and distention. She was presribed Linzess, but copay in $200, which she can't afford. She will go a week without a BM. Her last BM was this AM. Denies hard stools. Occasionally strains. Does not feel like she completely evacuates.   9/23/21: Pt RTC for f/u. Had EGD 8/20/21: - Z-line variable, 35 cm from the incisors. Biopsied. - Abnormal esophageal motility. Biopsied. - Erythematous mucosa in the stomach. Biopsied. - A Nissen fundoplication was found. The wrap appears tight. - Normal examined duodenum. - Dilation performed in the entire esophagus. Esophageal bx c/w acid reflux type changes. Neg for Barretts, EOE, dysplasia or malignancy. Stomach bx c/w foveolar hyperplasia. Neg for HPY, dysplasia or malignancy.  Remains on Linzes 145 mcg which helps with constipation. She mostly c/o epigastric discomfort with associated nausea and bloating. No aggravating or alleviating factors. Taking omeprazole 40 mg daily and Bentyl as needed. Denies osvaldo a or hematochezia.  3-14-22 Reports tenderness under the breast bone. Will eat raisin toast in morning, wont eat again until supper. Abdomen is distended and tight at the time.  Pt is taking linzess and states that she is getting good bowel movement with linzess but the BM are unpredictable.

## 2022-04-20 ENCOUNTER — TELEPHONE ENCOUNTER (OUTPATIENT)
Dept: URBAN - METROPOLITAN AREA CLINIC 92 | Facility: CLINIC | Age: 67
End: 2022-04-20

## 2022-04-20 RX ORDER — OMEPRAZOLE 40 MG/1
1 CAPSULE 30 MINUTES BEFORE MORNING MEAL ORALLY ONCE A DAY CAPSULE, DELAYED RELEASE ORAL
Qty: 90 | Refills: 2

## 2022-10-10 ENCOUNTER — TELEPHONE ENCOUNTER (OUTPATIENT)
Dept: URBAN - METROPOLITAN AREA CLINIC 54 | Facility: CLINIC | Age: 67
End: 2022-10-10

## 2022-10-10 ENCOUNTER — OFFICE VISIT (OUTPATIENT)
Dept: URBAN - METROPOLITAN AREA CLINIC 54 | Facility: CLINIC | Age: 67
End: 2022-10-10
Payer: MEDICARE

## 2022-10-10 VITALS
HEART RATE: 68 BPM | TEMPERATURE: 97.5 F | WEIGHT: 194 LBS | SYSTOLIC BLOOD PRESSURE: 97 MMHG | BODY MASS INDEX: 30.45 KG/M2 | HEIGHT: 67 IN | DIASTOLIC BLOOD PRESSURE: 58 MMHG

## 2022-10-10 DIAGNOSIS — Z86.010 PERSONAL HISTORY OF COLONIC POLYPS: ICD-10-CM

## 2022-10-10 DIAGNOSIS — K58.9 IBS (IRRITABLE BOWEL SYNDROME)-C: ICD-10-CM

## 2022-10-10 DIAGNOSIS — E11.9 DIABETES: ICD-10-CM

## 2022-10-10 DIAGNOSIS — R10.13 EPIGASTRIC PAIN: ICD-10-CM

## 2022-10-10 DIAGNOSIS — R74.8 LIVER ENZYME ELEVATION: ICD-10-CM

## 2022-10-10 PROBLEM — 111552007 DIABETES MELLITUS WITHOUT COMPLICATION: Status: ACTIVE | Noted: 2020-07-13

## 2022-10-10 PROBLEM — 165346000 LABORATORY TEST RESULT ABNORMAL: Status: ACTIVE | Noted: 2020-07-13

## 2022-10-10 PROBLEM — 10743008 IRRITABLE BOWEL SYNDROME: Status: ACTIVE | Noted: 2020-07-13

## 2022-10-10 PROCEDURE — 99214 OFFICE O/P EST MOD 30 MIN: CPT | Performed by: INTERNAL MEDICINE

## 2022-10-10 RX ORDER — OMEPRAZOLE 40 MG/1
1 CAPSULE 30 MINUTES BEFORE MORNING MEAL ORALLY ONCE A DAY CAPSULE, DELAYED RELEASE ORAL
Qty: 90 | Refills: 2 | Status: ACTIVE | COMMUNITY

## 2022-10-10 RX ORDER — ATENOLOL 25 MG/1
1 TABLET TABLET ORAL ONCE A DAY
Status: ACTIVE | COMMUNITY

## 2022-10-10 RX ORDER — ALPRAZOLAM 1 MG/1
0.5-1 TABLET TABLET ORAL
Status: ACTIVE | COMMUNITY

## 2022-10-10 RX ORDER — PAROXETINE HYDROCHLORIDE HEMIHYDRATE 20 MG/1
4 TABLETS IN THE MORNING TABLET, FILM COATED ORAL ONCE A DAY
Status: ACTIVE | COMMUNITY

## 2022-10-10 RX ORDER — SODIUM, POTASSIUM,MAG SULFATES 17.5-3.13G
354 ML SOLUTION, RECONSTITUTED, ORAL ORAL ONCE
Qty: 1 BOX | Refills: 0 | OUTPATIENT
Start: 2022-10-10 | End: 2022-10-11

## 2022-10-10 RX ORDER — DICYCLOMINE HYDROCHLORIDE 20 MG/1
1 TABLET TABLET ORAL
Qty: 90 | Refills: 1 | Status: ACTIVE | COMMUNITY

## 2022-10-10 RX ORDER — LINACLOTIDE 145 UG/1
1 CAPSULE AT LEAST 30 MINUTES BEFORE THE FIRST MEAL OF THE DAY ON AN EMPTY STOMACH CAPSULE, GELATIN COATED ORAL ONCE A DAY
Qty: 30 | Refills: 2 | Status: ACTIVE | COMMUNITY

## 2022-10-10 RX ORDER — TRAZODONE HYDROCHLORIDE 150 MG/1
1-2 TABLET AT BEDTIME TABLET ORAL
Status: ACTIVE | COMMUNITY

## 2022-10-10 RX ORDER — OXYCODONE HYDROCHLORIDE AND ACETAMINOPHEN 7.5; 325 MG/1; MG/1
1 TABLET AS NEEDED TABLET ORAL
Status: ACTIVE | COMMUNITY

## 2022-10-10 RX ORDER — FLUTICASONE PROPIONATE AND SALMETEROL 50; 250 UG/1; UG/1
1 PUFF POWDER RESPIRATORY (INHALATION) TWICE A DAY
Status: ACTIVE | COMMUNITY

## 2022-10-10 RX ORDER — ZOLPIDEM TARTRATE 10 MG/1
1 TABLET AT BEDTIME AS NEEDED TABLET, FILM COATED ORAL ONCE A DAY
Status: ACTIVE | COMMUNITY

## 2022-10-10 NOTE — HPI-TODAY'S VISIT:
66 yo female with hx of increased abdominal distension. Pt reports that she has abdominal pain. Hx of small bowel imaging.  Pt reports that she has trouble regulating bowel movements and gas.  Pt reports that she does take oxycodone occasionally.  Pt reports that she does take trazadone for sleeping. Pt reports that she has been doing ok. Pt s/p upper endoscopy with sensation of rocks in the stomach.Does still have some acid reflux changes but this is usually in the afternon. EGD with benign path except reflux. Pt report that she has cut back on her caffeine.  Pt reports that she is taking protonix.    Pt with mixed precancerous and hyperplastic polyps due for rp in 2023. Had diverticulosis and internal hemorrhoids as well. Taking amitiza and advised that amitiza would not be covered . Takes oxycodone twice monthly  8/19/21: Pt RTC for f/u. Had SBFT 7/1/21.  IMPRESSION  1. Postsurgical changes related to fundoplication. Small prominence likely postsurgical or mildly patulous portion of the wrap and less likely other etiologies above. 2. Esophageal dysmotility. 3. Narrowing along the distal thoracic esophagus without delay in passage of a 12.5 mm barium tablet. 4. Small duodenal diverticulum.  She is scheduled for EGD on 8/20/21. She reports constipation, gas and distention. She was presribed Linzess, but copay in $200, which she can't afford. She will go a week without a BM. Her last BM was this AM. Denies hard stools. Occasionally strains. Does not feel like she completely evacuates.   9/23/21: Pt RTC for f/u. Had EGD 8/20/21: - Z-line variable, 35 cm from the incisors. Biopsied. - Abnormal esophageal motility. Biopsied. - Erythematous mucosa in the stomach. Biopsied. - A Nissen fundoplication was found. The wrap appears tight. - Normal examined duodenum. - Dilation performed in the entire esophagus. Esophageal bx c/w acid reflux type changes. Neg for Barretts, EOE, dysplasia or malignancy. Stomach bx c/w foveolar hyperplasia. Neg for HPY, dysplasia or malignancy.  Remains on Linzes 145 mcg which helps with constipation. She mostly c/o epigastric discomfort with associated nausea and bloating. No aggravating or alleviating factors. Taking omeprazole 40 mg daily and Bentyl as needed. Denies osvaldo a or hematochezia.  3-14-22 Reports tenderness under the breast bone. Will eat raisin toast in morning, wont eat again until supper. Abdomen is distended and tight at the time.  Pt is taking linzess and states that she is getting good bowel movement with linzess but the BM are unpredictable. 10-10-22 Pt reporrts that she feels acid in the epigastrium. Has tenderness in the epigastric region. Feels that there is gravel in the epigastrium. Pt reports that she is walking on the treadmill now at least several x/week.  Pt reports that she is taking the linzess every few days. Pt reports that she wakes up sick to the stomach. Does not get hungry until she initiates her first meal then her appetite becomes ravenous.   Pt reports that her

## 2022-10-11 PROBLEM — 428283002: Status: ACTIVE | Noted: 2022-10-10

## 2022-10-26 ENCOUNTER — OFFICE VISIT (OUTPATIENT)
Dept: URBAN - METROPOLITAN AREA SURGERY CENTER 14 | Facility: SURGERY CENTER | Age: 67
End: 2022-10-26

## 2022-11-17 ENCOUNTER — OFFICE VISIT (OUTPATIENT)
Dept: URBAN - METROPOLITAN AREA CLINIC 54 | Facility: CLINIC | Age: 67
End: 2022-11-17

## 2023-02-08 ENCOUNTER — TELEPHONE ENCOUNTER (OUTPATIENT)
Dept: URBAN - METROPOLITAN AREA CLINIC 54 | Facility: CLINIC | Age: 68
End: 2023-02-08

## 2023-02-08 RX ORDER — DICYCLOMINE HYDROCHLORIDE 20 MG/1
1 TABLET TABLET ORAL
Qty: 90 | Refills: 1
End: 2023-04-09

## 2023-02-10 ENCOUNTER — TELEPHONE ENCOUNTER (OUTPATIENT)
Dept: URBAN - METROPOLITAN AREA CLINIC 54 | Facility: CLINIC | Age: 68
End: 2023-02-10

## 2023-02-10 RX ORDER — DICYCLOMINE HYDROCHLORIDE 20 MG/1
1 TABLET TABLET ORAL
Qty: 90 | Refills: 1
End: 2023-04-11

## 2023-03-18 ENCOUNTER — ERX REFILL RESPONSE (OUTPATIENT)
Dept: URBAN - METROPOLITAN AREA CLINIC 54 | Facility: CLINIC | Age: 68
End: 2023-03-18

## 2023-03-18 RX ORDER — DICYCLOMINE HYDROCHLORIDE 20 MG/1
1 TABLET ORALLY EVERY 6 HOURS AS NEEDED 30 DAYS TABLET ORAL
Qty: 90 TABLET | Refills: 1 | OUTPATIENT

## 2023-03-18 RX ORDER — DICYCLOMINE HYDROCHLORIDE 20 MG/1
1 TABLET TABLET ORAL
Qty: 90 | Refills: 1 | OUTPATIENT

## 2023-04-08 ENCOUNTER — ERX REFILL RESPONSE (OUTPATIENT)
Dept: URBAN - METROPOLITAN AREA CLINIC 54 | Facility: CLINIC | Age: 68
End: 2023-04-08

## 2023-04-08 RX ORDER — DICYCLOMINE HYDROCHLORIDE 20 MG/1
TAKE 1 TABLET BY MOUTH THREE TIMES DAILY AS NEEDED FOR 30 DAYS TABLET ORAL THREE TIMES A DAY
Qty: 270 | Refills: 3 | OUTPATIENT

## 2023-04-08 RX ORDER — DICYCLOMINE HYDROCHLORIDE 20 MG/1
1 TABLET ORALLY EVERY 6 HOURS AS NEEDED 30 DAYS TABLET ORAL
Qty: 90 TABLET | Refills: 1 | OUTPATIENT

## 2023-04-10 ENCOUNTER — LAB OUTSIDE AN ENCOUNTER (OUTPATIENT)
Dept: URBAN - METROPOLITAN AREA CLINIC 54 | Facility: CLINIC | Age: 68
End: 2023-04-10

## 2023-04-12 ENCOUNTER — OFFICE VISIT (OUTPATIENT)
Dept: URBAN - METROPOLITAN AREA SURGERY CENTER 14 | Facility: SURGERY CENTER | Age: 68
End: 2023-04-12

## 2023-04-24 ENCOUNTER — TELEPHONE ENCOUNTER (OUTPATIENT)
Dept: URBAN - METROPOLITAN AREA CLINIC 54 | Facility: CLINIC | Age: 68
End: 2023-04-24

## 2023-05-09 ENCOUNTER — OFFICE VISIT (OUTPATIENT)
Dept: URBAN - METROPOLITAN AREA SURGERY CENTER 14 | Facility: SURGERY CENTER | Age: 68
End: 2023-05-09

## 2023-05-23 ENCOUNTER — OFFICE VISIT (OUTPATIENT)
Dept: URBAN - METROPOLITAN AREA SURGERY CENTER 14 | Facility: SURGERY CENTER | Age: 68
End: 2023-05-23

## 2023-06-13 ENCOUNTER — OFFICE VISIT (OUTPATIENT)
Dept: URBAN - METROPOLITAN AREA SURGERY CENTER 14 | Facility: SURGERY CENTER | Age: 68
End: 2023-06-13

## 2023-06-22 ENCOUNTER — TELEPHONE ENCOUNTER (OUTPATIENT)
Dept: URBAN - METROPOLITAN AREA CLINIC 54 | Facility: CLINIC | Age: 68
End: 2023-06-22

## 2023-06-22 RX ORDER — PANTOPRAZOLE SODIUM 40 MG/1
1 TABLET TABLET, DELAYED RELEASE ORAL ONCE A DAY
Qty: 30 | Refills: 3 | OUTPATIENT
Start: 2023-06-23

## 2023-07-14 ENCOUNTER — OFFICE VISIT (OUTPATIENT)
Dept: URBAN - METROPOLITAN AREA SURGERY CENTER 14 | Facility: SURGERY CENTER | Age: 68
End: 2023-07-14

## 2023-10-03 ENCOUNTER — OFFICE VISIT (OUTPATIENT)
Dept: URBAN - METROPOLITAN AREA SURGERY CENTER 14 | Facility: SURGERY CENTER | Age: 68
End: 2023-10-03

## 2023-10-05 ENCOUNTER — OFFICE VISIT (OUTPATIENT)
Dept: URBAN - NONMETROPOLITAN AREA CLINIC 4 | Facility: CLINIC | Age: 68
End: 2023-10-05

## 2023-11-17 ENCOUNTER — APPOINTMENT (RX ONLY)
Dept: URBAN - METROPOLITAN AREA OTHER 13 | Facility: OTHER | Age: 68
Setting detail: DERMATOLOGY
End: 2023-11-17

## 2023-11-17 DIAGNOSIS — L85.3 XEROSIS CUTIS: ICD-10-CM

## 2023-11-17 DIAGNOSIS — L73.9 FOLLICULAR DISORDER, UNSPECIFIED: ICD-10-CM | Status: INADEQUATELY CONTROLLED

## 2023-11-17 DIAGNOSIS — B00.1 HERPESVIRAL VESICULAR DERMATITIS: ICD-10-CM

## 2023-11-17 DIAGNOSIS — L663 OTHER SPECIFIED DISEASES OF HAIR AND HAIR FOLLICLES: ICD-10-CM | Status: INADEQUATELY CONTROLLED

## 2023-11-17 DIAGNOSIS — L28.1 PRURIGO NODULARIS: ICD-10-CM

## 2023-11-17 DIAGNOSIS — L738 OTHER SPECIFIED DISEASES OF HAIR AND HAIR FOLLICLES: ICD-10-CM | Status: INADEQUATELY CONTROLLED

## 2023-11-17 PROBLEM — L02.821 FURUNCLE OF HEAD [ANY PART, EXCEPT FACE]: Status: ACTIVE | Noted: 2023-11-17

## 2023-11-17 PROCEDURE — ? PRESCRIPTION

## 2023-11-17 PROCEDURE — 99204 OFFICE O/P NEW MOD 45 MIN: CPT

## 2023-11-17 PROCEDURE — ? TREATMENT REGIMEN

## 2023-11-17 PROCEDURE — ? OTC TREATMENT REGIMEN

## 2023-11-17 PROCEDURE — ? PRESCRIPTION MEDICATION MANAGEMENT

## 2023-11-17 PROCEDURE — ? COUNSELING

## 2023-11-17 RX ORDER — CLOBETASOL PROPIONATE 0.5 MG/ML
SOLUTION TOPICAL
Qty: 50 | Refills: 0 | Status: ERX | COMMUNITY
Start: 2023-11-17

## 2023-11-17 RX ORDER — TRIAMCINOLONE ACETONIDE 1 MG/G
OINTMENT TOPICAL
Qty: 30 | Refills: 0 | Status: ERX | COMMUNITY
Start: 2023-11-17

## 2023-11-17 RX ADMIN — CLOBETASOL PROPIONATE: 0.5 SOLUTION TOPICAL at 00:00

## 2023-11-17 RX ADMIN — TRIAMCINOLONE ACETONIDE: 1 OINTMENT TOPICAL at 00:00

## 2023-11-17 ASSESSMENT — LOCATION ZONE DERM
LOCATION ZONE: TRUNK
LOCATION ZONE: SCALP
LOCATION ZONE: HAND
LOCATION ZONE: ARM

## 2023-11-17 ASSESSMENT — LOCATION SIMPLE DESCRIPTION DERM
LOCATION SIMPLE: RIGHT POSTERIOR UPPER ARM
LOCATION SIMPLE: RIGHT SCALP
LOCATION SIMPLE: LEFT FOREARM
LOCATION SIMPLE: LEFT UPPER ARM
LOCATION SIMPLE: RIGHT FOREARM
LOCATION SIMPLE: RIGHT SHOULDER
LOCATION SIMPLE: UPPER BACK
LOCATION SIMPLE: RIGHT UPPER ARM
LOCATION SIMPLE: RIGHT LOWER BACK
LOCATION SIMPLE: LEFT HAND

## 2023-11-17 ASSESSMENT — LOCATION DETAILED DESCRIPTION DERM
LOCATION DETAILED: SUPERIOR THORACIC SPINE
LOCATION DETAILED: LEFT RADIAL DORSAL HAND
LOCATION DETAILED: RIGHT MEDIAL FRONTAL SCALP
LOCATION DETAILED: LEFT DISTAL DORSAL FOREARM
LOCATION DETAILED: RIGHT PROXIMAL POSTERIOR UPPER ARM
LOCATION DETAILED: LEFT ANTERIOR PROXIMAL UPPER ARM
LOCATION DETAILED: RIGHT INFERIOR LATERAL LOWER BACK
LOCATION DETAILED: RIGHT ANTERIOR PROXIMAL UPPER ARM
LOCATION DETAILED: RIGHT PROXIMAL DORSAL FOREARM
LOCATION DETAILED: RIGHT DISTAL DORSAL FOREARM
LOCATION DETAILED: RIGHT ANTERIOR SHOULDER

## 2023-11-17 NOTE — PROCEDURE: MIPS QUALITY
Quality 47: Advance Care Plan: Advance Care Planning discussed and documented in the medical record; patient did not wish or was not able to name a surrogate decision maker or provide an advance care plan.
Quality 110: Preventive Care And Screening: Influenza Immunization: Influenza Immunization Administered during Influenza season
Quality 431: Preventive Care And Screening: Unhealthy Alcohol Use - Screening: Patient not identified as an unhealthy alcohol user when screened for unhealthy alcohol use using a systematic screening method
Quality 130: Documentation Of Current Medications In The Medical Record: Current Medications Documented
Detail Level: Detailed
Quality 111:Pneumonia Vaccination Status For Older Adults: Patient received any pneumococcal conjugate or polysaccharide vaccine on or after their 60th birthday and before the end of the measurement period
Quality 226: Preventive Care And Screening: Tobacco Use: Screening And Cessation Intervention: Patient screened for tobacco use and is an ex/non-smoker

## 2023-11-17 NOTE — PROCEDURE: TREATMENT REGIMEN
Discontinue Regimen: Avoid picking
Detail Level: Zone
Otc Regimen: Recommended otc head and shoulders
Plan: Advised  to photo document active breakouts

## 2023-11-17 NOTE — PROCEDURE: PRESCRIPTION MEDICATION MANAGEMENT
Render In Strict Bullet Format?: No
Detail Level: Simple
Initiate Treatment: Tac ointment twice daily to itchy spots, prn (under occlusion)
Initiate Treatment: Clobetasol solution bid x2 weeks
Plan: Reassess in 1 month

## 2024-01-18 ENCOUNTER — APPOINTMENT (RX ONLY)
Dept: URBAN - METROPOLITAN AREA OTHER 13 | Facility: OTHER | Age: 69
Setting detail: DERMATOLOGY
End: 2024-01-18

## 2024-01-18 DIAGNOSIS — L738 OTHER SPECIFIED DISEASES OF HAIR AND HAIR FOLLICLES: ICD-10-CM

## 2024-01-18 DIAGNOSIS — L663 OTHER SPECIFIED DISEASES OF HAIR AND HAIR FOLLICLES: ICD-10-CM

## 2024-01-18 DIAGNOSIS — L73.9 FOLLICULAR DISORDER, UNSPECIFIED: ICD-10-CM

## 2024-01-18 DIAGNOSIS — L28.1 PRURIGO NODULARIS: ICD-10-CM | Status: RESOLVING

## 2024-01-18 PROBLEM — L02.821 FURUNCLE OF HEAD [ANY PART, EXCEPT FACE]: Status: ACTIVE | Noted: 2024-01-18

## 2024-01-18 PROCEDURE — ? COUNSELING

## 2024-01-18 PROCEDURE — ? PRESCRIPTION

## 2024-01-18 PROCEDURE — 99214 OFFICE O/P EST MOD 30 MIN: CPT

## 2024-01-18 PROCEDURE — ? TREATMENT REGIMEN

## 2024-01-18 PROCEDURE — ? PRESCRIPTION MEDICATION MANAGEMENT

## 2024-01-18 RX ORDER — MUPIROCIN 20 MG/G
OINTMENT TOPICAL
Qty: 22 | Refills: 3 | Status: ERX | COMMUNITY
Start: 2024-01-18

## 2024-01-18 RX ADMIN — MUPIROCIN: 20 OINTMENT TOPICAL at 00:00

## 2024-01-18 ASSESSMENT — LOCATION DETAILED DESCRIPTION DERM
LOCATION DETAILED: SUPERIOR THORACIC SPINE
LOCATION DETAILED: RIGHT MEDIAL FRONTAL SCALP
LOCATION DETAILED: RIGHT ANTERIOR SHOULDER
LOCATION DETAILED: RIGHT PROXIMAL DORSAL FOREARM
LOCATION DETAILED: LEFT DISTAL DORSAL FOREARM

## 2024-01-18 ASSESSMENT — LOCATION SIMPLE DESCRIPTION DERM
LOCATION SIMPLE: UPPER BACK
LOCATION SIMPLE: RIGHT FOREARM
LOCATION SIMPLE: RIGHT SHOULDER
LOCATION SIMPLE: LEFT FOREARM
LOCATION SIMPLE: RIGHT SCALP

## 2024-01-18 ASSESSMENT — LOCATION ZONE DERM
LOCATION ZONE: SCALP
LOCATION ZONE: TRUNK
LOCATION ZONE: ARM

## 2024-01-18 NOTE — PROCEDURE: PRESCRIPTION MEDICATION MANAGEMENT
Continue Regimen: Tac ointment twice daily to itchy spots, prn (under occlusion)\\n\\nDove soap and Cerave lotion
Render In Strict Bullet Format?: No
Detail Level: Simple
Initiate Treatment: mupirocin 2 % topical ointment \\nQuantity: 22.0 g  Days Supply: 30\\nSig: Apply to affected areas on scalp PRN
Plan: Reassess in 1 month
Discontinue Regimen: Clobetasol solution bid x2 weeks

## 2024-02-29 ENCOUNTER — APPOINTMENT (RX ONLY)
Dept: URBAN - METROPOLITAN AREA OTHER 13 | Facility: OTHER | Age: 69
Setting detail: DERMATOLOGY
End: 2024-02-29

## 2024-02-29 DIAGNOSIS — L738 OTHER SPECIFIED DISEASES OF HAIR AND HAIR FOLLICLES: ICD-10-CM | Status: WELL CONTROLLED

## 2024-02-29 DIAGNOSIS — L663 OTHER SPECIFIED DISEASES OF HAIR AND HAIR FOLLICLES: ICD-10-CM | Status: WELL CONTROLLED

## 2024-02-29 DIAGNOSIS — B00.1 HERPESVIRAL VESICULAR DERMATITIS: ICD-10-CM

## 2024-02-29 DIAGNOSIS — L28.1 PRURIGO NODULARIS: ICD-10-CM | Status: IMPROVED

## 2024-02-29 DIAGNOSIS — L73.9 FOLLICULAR DISORDER, UNSPECIFIED: ICD-10-CM | Status: WELL CONTROLLED

## 2024-02-29 PROBLEM — L02.821 FURUNCLE OF HEAD [ANY PART, EXCEPT FACE]: Status: ACTIVE | Noted: 2024-02-29

## 2024-02-29 PROCEDURE — ? TREATMENT REGIMEN

## 2024-02-29 PROCEDURE — 99214 OFFICE O/P EST MOD 30 MIN: CPT

## 2024-02-29 PROCEDURE — ? COUNSELING

## 2024-02-29 PROCEDURE — ? PRESCRIPTION MEDICATION MANAGEMENT

## 2024-02-29 PROCEDURE — ? PRESCRIPTION

## 2024-02-29 RX ORDER — ACYCLOVIR 50 MG/G
OINTMENT TOPICAL
Qty: 30 | Refills: 3 | Status: ERX | COMMUNITY
Start: 2024-02-29

## 2024-02-29 RX ADMIN — ACYCLOVIR: 50 OINTMENT TOPICAL at 00:00

## 2024-02-29 ASSESSMENT — LOCATION DETAILED DESCRIPTION DERM
LOCATION DETAILED: RIGHT PROXIMAL DORSAL FOREARM
LOCATION DETAILED: SUPERIOR THORACIC SPINE
LOCATION DETAILED: LEFT DISTAL DORSAL FOREARM
LOCATION DETAILED: RIGHT ANTERIOR SHOULDER
LOCATION DETAILED: LEFT BUTTOCK
LOCATION DETAILED: RIGHT MEDIAL FRONTAL SCALP

## 2024-02-29 ASSESSMENT — LOCATION SIMPLE DESCRIPTION DERM
LOCATION SIMPLE: LEFT FOREARM
LOCATION SIMPLE: RIGHT SHOULDER
LOCATION SIMPLE: RIGHT SCALP
LOCATION SIMPLE: UPPER BACK
LOCATION SIMPLE: LEFT BUTTOCK
LOCATION SIMPLE: RIGHT FOREARM

## 2024-02-29 ASSESSMENT — LOCATION ZONE DERM
LOCATION ZONE: ARM
LOCATION ZONE: SCALP
LOCATION ZONE: TRUNK

## 2024-02-29 NOTE — PROCEDURE: PRESCRIPTION MEDICATION MANAGEMENT
Continue Regimen: Dove soap and Cerave lotion
Render In Strict Bullet Format?: No
Detail Level: Simple
Discontinue Regimen: Tac ointment twice daily to itchy spots, prn (under occlusion)
Discontinue Regimen: mupirocin 2 % topical ointment \\nQuantity: 22.0 g  Days Supply: 30\\nSig: Apply to affected areas on scalp PRN
Initiate Treatment: acyclovir 5 % topical ointment \\nQuantity: 30.0 g  Days Supply: 30\\nSig: Apply to AA, every 3 hours while awake x3 days.
Detail Level: Detailed
Plan: Patient to f/u at 4 month fbs

## 2024-03-28 ENCOUNTER — OV NP (OUTPATIENT)
Dept: URBAN - NONMETROPOLITAN AREA CLINIC 4 | Facility: CLINIC | Age: 69
End: 2024-03-28

## 2024-04-02 ENCOUNTER — OV EP (OUTPATIENT)
Dept: URBAN - NONMETROPOLITAN AREA CLINIC 4 | Facility: CLINIC | Age: 69
End: 2024-04-02

## 2024-04-02 RX ORDER — ALPRAZOLAM 1 MG/1
0.5-1 TABLET TABLET ORAL
COMMUNITY

## 2024-04-02 RX ORDER — ZOLPIDEM TARTRATE 10 MG/1
1 TABLET AT BEDTIME AS NEEDED TABLET, FILM COATED ORAL ONCE A DAY
COMMUNITY

## 2024-04-02 RX ORDER — TRAZODONE HYDROCHLORIDE 150 MG/1
1-2 TABLET AT BEDTIME TABLET ORAL
COMMUNITY

## 2024-04-02 RX ORDER — FLUTICASONE PROPIONATE AND SALMETEROL 50; 250 UG/1; UG/1
1 PUFF POWDER RESPIRATORY (INHALATION) TWICE A DAY
COMMUNITY

## 2024-04-02 RX ORDER — OMEPRAZOLE 40 MG/1
1 CAPSULE 30 MINUTES BEFORE MORNING MEAL ORALLY ONCE A DAY CAPSULE, DELAYED RELEASE ORAL
Qty: 90 | Refills: 2 | COMMUNITY

## 2024-04-02 RX ORDER — PAROXETINE HYDROCHLORIDE HEMIHYDRATE 20 MG/1
4 TABLETS IN THE MORNING TABLET, FILM COATED ORAL ONCE A DAY
COMMUNITY

## 2024-04-02 RX ORDER — DICYCLOMINE HYDROCHLORIDE 20 MG/1
TAKE 1 TABLET BY MOUTH THREE TIMES DAILY AS NEEDED FOR 30 DAYS TABLET ORAL THREE TIMES A DAY
Qty: 270 | Refills: 3 | COMMUNITY

## 2024-04-02 RX ORDER — ATENOLOL 25 MG/1
1 TABLET TABLET ORAL ONCE A DAY
COMMUNITY

## 2024-04-02 RX ORDER — PANTOPRAZOLE SODIUM 40 MG/1
1 TABLET TABLET, DELAYED RELEASE ORAL ONCE A DAY
Qty: 30 | Refills: 3 | COMMUNITY
Start: 2023-06-23

## 2024-04-02 RX ORDER — LINACLOTIDE 145 UG/1
1 CAPSULE AT LEAST 30 MINUTES BEFORE THE FIRST MEAL OF THE DAY ON AN EMPTY STOMACH CAPSULE, GELATIN COATED ORAL ONCE A DAY
Qty: 30 | Refills: 2 | COMMUNITY

## 2024-04-02 RX ORDER — OXYCODONE HYDROCHLORIDE AND ACETAMINOPHEN 7.5; 325 MG/1; MG/1
1 TABLET AS NEEDED TABLET ORAL
COMMUNITY

## 2024-06-10 ENCOUNTER — OFFICE VISIT (OUTPATIENT)
Dept: URBAN - NONMETROPOLITAN AREA CLINIC 4 | Facility: CLINIC | Age: 69
End: 2024-06-10

## 2024-08-30 NOTE — PHYSICAL EXAM NECK/THYROID:
normal appearance , without tenderness upon palpation , no deformities , trachea midline , Thyroid normal size , no thyroid nodules , no masses , no JVD , thyroid nontender Vaccine status unknown